# Patient Record
Sex: FEMALE | Race: OTHER | HISPANIC OR LATINO | Employment: UNEMPLOYED | URBAN - METROPOLITAN AREA
[De-identification: names, ages, dates, MRNs, and addresses within clinical notes are randomized per-mention and may not be internally consistent; named-entity substitution may affect disease eponyms.]

---

## 2023-06-23 ENCOUNTER — TELEPHONE (OUTPATIENT)
Dept: NEPHROLOGY | Facility: CLINIC | Age: 43
End: 2023-06-23

## 2023-06-23 NOTE — TELEPHONE ENCOUNTER
New Patient Intake Form   Patient Details   Rosamaria Romano     1980     [id number]     Insurance Information   Name of Eron Marroquin   Does the patient need an insurance referral? no   If patient has Pitalan Susan, please ask if they will be using their [facility]  Appointment Information   Who is calling to schedule? If not patient, what is callers name? Patient/    Referring Provider PCP Tao Muniz    Reason for Appt (Diagnosis) Transplant 2014   Does Patient have labs/urine done at [place] [address]? If not, where do they go? List the date of last lab / urine  *Please try to get labs 2 years back if not at  No  [facility]   Has patient been hospitalized recently? If yes, list name and location of hospital they were in Yes  [facility]  May 2023   Has patient been seen by a Nephrologist before? If yes, list name, location and phone number Yes  [facility]     Has the patient had renal imaging done? If so, list the most recent date and type of imaging yes    Does patient have a history of Kidney Stones?  no   Appointment Details   Is there a referral on file? no    Appointment Date 9/18/23    Location  [address] [place] [address]   Contacting pcp office to fax over medical record and visit notes

## 2023-06-23 NOTE — TELEPHONE ENCOUNTER
Call patient PCP to get medical records and referral and per Dr Yue Cullen office I was referred to call Dr Caroline Diallo from oncology office to get patients records and referral due to Dr Caroline Diallo referred patient to Nephrology and most recent labs would come from Dr Caroline Diallo office  Medical records request faxed to Dr Caroline Diallo office

## 2023-09-18 ENCOUNTER — TELEPHONE (OUTPATIENT)
Dept: NEPHROLOGY | Facility: CLINIC | Age: 43
End: 2023-09-18